# Patient Record
Sex: MALE | Race: WHITE | NOT HISPANIC OR LATINO
[De-identification: names, ages, dates, MRNs, and addresses within clinical notes are randomized per-mention and may not be internally consistent; named-entity substitution may affect disease eponyms.]

---

## 2020-06-29 ENCOUNTER — APPOINTMENT (OUTPATIENT)
Dept: PEDIATRICS | Facility: CLINIC | Age: 20
End: 2020-06-29
Payer: COMMERCIAL

## 2020-06-29 VITALS
BODY MASS INDEX: 28.37 KG/M2 | HEIGHT: 67.75 IN | DIASTOLIC BLOOD PRESSURE: 79 MMHG | HEART RATE: 86 BPM | WEIGHT: 185.06 LBS | TEMPERATURE: 98.7 F | SYSTOLIC BLOOD PRESSURE: 137 MMHG

## 2020-06-29 DIAGNOSIS — Z13.6 ENCOUNTER FOR SCREENING FOR CARDIOVASCULAR DISORDERS: ICD-10-CM

## 2020-06-29 DIAGNOSIS — Z00.00 ENCOUNTER FOR GENERAL ADULT MEDICAL EXAMINATION W/OUT ABNORMAL FINDINGS: ICD-10-CM

## 2020-06-29 LAB
BILIRUB UR QL STRIP: NEGATIVE
GLUCOSE UR-MCNC: NEGATIVE
HCG UR QL: 0.2 EU/DL
HGB UR QL STRIP.AUTO: NORMAL
KETONES UR-MCNC: NEGATIVE
LEUKOCYTE ESTERASE UR QL STRIP: NEGATIVE
NITRITE UR QL STRIP: NEGATIVE
PH UR STRIP: 5.5
PROT UR STRIP-MCNC: NEGATIVE
SP GR UR STRIP: >=1.03

## 2020-06-29 PROCEDURE — 81003 URINALYSIS AUTO W/O SCOPE: CPT | Mod: QW

## 2020-06-29 PROCEDURE — 90651 9VHPV VACCINE 2/3 DOSE IM: CPT

## 2020-06-29 PROCEDURE — 96127 BRIEF EMOTIONAL/BEHAV ASSMT: CPT

## 2020-06-29 PROCEDURE — 99395 PREV VISIT EST AGE 18-39: CPT | Mod: 25

## 2020-06-29 PROCEDURE — 96160 PT-FOCUSED HLTH RISK ASSMT: CPT | Mod: 59

## 2020-06-29 PROCEDURE — 90471 IMMUNIZATION ADMIN: CPT

## 2020-06-29 NOTE — DISCUSSION/SUMMARY
[FreeTextEntry1] : Healthy 18 year old/young adult\par Discussed safety/anticipatory guidance\par Discussed avoiding risk taking behavior\par Discussed healthy lifestyle habits\par Reviewed immunization forecast and discussed need for any vaccines, reviewed side effects and VIS\par Discussed need for routine health maintenance and establishing relationship with adult provider\par Discussed need for routine WILLIAM and gave appropriate handout\par Follow-up: 1 year with adult provider\par \par Discussed and/or provided information on the following:\par PHYSICAL GROWTH AND DEVELOPMENT: Physical and oral health, body image, healthy eating, physical activity\par SOCIAL AND ACADEMIC COMPENTENCE: Connectedness with family, peers, and community; interpersonal relationships; school performance\par EMOTIONAL WELL-BEING: Coping, mood regulation and mental health (depression), sexuality\par RISK REDUCTION: Tobacco, alcohol, or other drugs; pregnancy; STIs, Advice about unprotected sex/birth control\par VIOLENCE AND INJURY PREVENTION: Safety belt and helmet use, driving (graduated license) and substance abuse, guns, interpersonal violence (dating violence), bullying\par PHYSICAL ACTIVITY: Adequate physical activity in organized sports, after-school programs, fun activities; limits on screen time\par COUNSELLED WITH DIET AND EXERCISE\par RTO 2M FOR BP CHECK [] : The components of the vaccine(s) to be administered today are listed in the plan of care. The disease(s) for which the vaccine(s) are intended to prevent and the risks have been discussed with the caretaker.  The risks are also included in the appropriate vaccination information statements which have been provided to the patient's caregiver.  The caregiver has given consent to vaccinate.

## 2020-06-29 NOTE — HISTORY OF PRESENT ILLNESS
[Up to date] : Up to date [Eats meals with family] : eats meals with family [Has family members/adults to turn to for help] : has family members/adults to turn to for help [Is permitted and is able to make independent decisions] : Is permitted and is able to make independent decisions [Grade: ____] : Grade: [unfilled] [Normal Behavior/Attention] : normal behavior/attention [Normal Homework] : normal homework [Normal Performance] : normal performance [Drinks non-sweetened liquids] : drinks non-sweetened liquids  [Eats regular meals including adequate fruits and vegetables] : eats regular meals including adequate fruits and vegetables [Calcium source] : calcium source [Has friends] : has friends [No] : No cigarette smoke exposure [Uses safety belts/safety equipment] : uses safety belts/safety equipment  [Has peer relationships free of violence] : has peer relationships free of violence [Yes] : Patient has had sexual intercourse. [Displays self-confidence] : displays self-confidence [HIV Screening Declined] : HIV Screening Declined [Has ways to cope with stress] : has ways to cope with stress [With Teen] : teen [Sleep Concerns] : no sleep concerns [Screen time (except homework) less than 2 hours a day] : no screen time (except homework) less than 2 hours a day [At least 1 hour of physical activity a day] : does not do at least 1 hour of physical activity a day [Has concerns about body or appearance] : does not have concerns about body or appearance [Has interests/participates in community activities/volunteers] : does not have interests/participates in community activities/volunteers [Uses electronic nicotine delivery system] : does not use electronic nicotine delivery system [Exposure to electronic nicotine delivery system] : no exposure to electronic nicotine delivery system [Uses tobacco] : does not use tobacco [Uses drugs] : does not use drugs  [Exposure to tobacco] : no exposure to tobacco [Exposure to drugs] : no exposure to drugs [Drinks alcohol] : does not drink alcohol [Impaired/distracted driving] : no impaired/distracted driving [Has problems with sleep] : does not have problems with sleep [Exposure to alcohol] : no exposure to alcohol [Gets depressed, anxious, or irritable/has mood swings] : does not get depressed, anxious, or irritable/has mood swings [Has thought about hurting self or considered suicide] : has not thought about hurting self or considered suicide [de-identified] : ALONE [FreeTextEntry1] : 20 YEARS WELL VISIT

## 2020-06-29 NOTE — PHYSICAL EXAM
[Alert] : alert [No Acute Distress] : no acute distress [Normocephalic] : normocephalic [EOMI Bilateral] : EOMI bilateral [Pink Nasal Mucosa] : pink nasal mucosa [Clear tympanic membranes with bony landmarks and light reflex present bilaterally] : clear tympanic membranes with bony landmarks and light reflex present bilaterally  [Supple, full passive range of motion] : supple, full passive range of motion [Nonerythematous Oropharynx] : nonerythematous oropharynx [No Palpable Masses] : no palpable masses [Regular Rate and Rhythm] : regular rate and rhythm [Normal S1, S2 audible] : normal S1, S2 audible [Clear to Auscultation Bilaterally] : clear to auscultation bilaterally [No Murmurs] : no murmurs [+2 Femoral Pulses] : +2 femoral pulses [Soft] : soft [Normoactive Bowel Sounds] : normoactive bowel sounds [NonTender] : non tender [Non Distended] : non distended [No Splenomegaly] : no splenomegaly [No Hepatomegaly] : no hepatomegaly [No Abnormal Lymph Nodes Palpated] : no abnormal lymph nodes palpated [No Gait Asymmetry] : no gait asymmetry [Normal Muscle Tone] : normal muscle tone [Straight] : straight [+2 Patella DTR] : +2 patella DTR [No pain or deformities with palpation of bone, muscles, joints] : no pain or deformities with palpation of bone, muscles, joints [No Rash or Lesions] : no rash or lesions [Cranial Nerves Grossly Intact] : cranial nerves grossly intact

## 2020-08-01 ENCOUNTER — LABORATORY RESULT (OUTPATIENT)
Age: 20
End: 2020-08-01

## 2020-08-05 LAB
ALBUMIN SERPL ELPH-MCNC: 4.8 G/DL
ALP BLD-CCNC: 78 U/L
ALT SERPL-CCNC: 31 U/L
ANION GAP SERPL CALC-SCNC: 13 MMOL/L
APPEARANCE: ABNORMAL
AST SERPL-CCNC: 24 U/L
BASOPHILS # BLD AUTO: 0.02 K/UL
BASOPHILS NFR BLD AUTO: 0.3 %
BILIRUB SERPL-MCNC: 0.3 MG/DL
BILIRUBIN URINE: NEGATIVE
BLOOD URINE: NEGATIVE
BUN SERPL-MCNC: 11 MG/DL
CALCIUM SERPL-MCNC: 9.5 MG/DL
CHLORIDE SERPL-SCNC: 103 MMOL/L
CHOLEST SERPL-MCNC: 199 MG/DL
CHOLEST/HDLC SERPL: 6 RATIO
CO2 SERPL-SCNC: 25 MMOL/L
COLOR: YELLOW
CREAT SERPL-MCNC: 1.02 MG/DL
EOSINOPHIL # BLD AUTO: 0.07 K/UL
EOSINOPHIL NFR BLD AUTO: 1 %
GLUCOSE QUALITATIVE U: NEGATIVE
GLUCOSE SERPL-MCNC: 97 MG/DL
HCT VFR BLD CALC: 51.9 %
HDLC SERPL-MCNC: 33 MG/DL
HGB BLD-MCNC: 16.4 G/DL
IMM GRANULOCYTES NFR BLD AUTO: 0.3 %
KETONES URINE: NEGATIVE
LDLC SERPL CALC-MCNC: 120 MG/DL
LEUKOCYTE ESTERASE URINE: NEGATIVE
LYMPHOCYTES # BLD AUTO: 2.31 K/UL
LYMPHOCYTES NFR BLD AUTO: 33.3 %
MAN DIFF?: NORMAL
MCHC RBC-ENTMCNC: 27.6 PG
MCHC RBC-ENTMCNC: 31.6 GM/DL
MCV RBC AUTO: 87.4 FL
MONOCYTES # BLD AUTO: 0.73 K/UL
MONOCYTES NFR BLD AUTO: 10.5 %
NEUTROPHILS # BLD AUTO: 3.79 K/UL
NEUTROPHILS NFR BLD AUTO: 54.6 %
NITRITE URINE: NEGATIVE
PH URINE: 6
PLATELET # BLD AUTO: 252 K/UL
POTASSIUM SERPL-SCNC: 4.2 MMOL/L
PROT SERPL-MCNC: 7.3 G/DL
PROTEIN URINE: NORMAL
RBC # BLD: 5.94 M/UL
RBC # FLD: 12.7 %
SODIUM SERPL-SCNC: 141 MMOL/L
SPECIFIC GRAVITY URINE: 1.03
TRIGL SERPL-MCNC: 230 MG/DL
TSH SERPL-ACNC: 2.61 UIU/ML
UROBILINOGEN URINE: NORMAL
WBC # FLD AUTO: 6.94 K/UL

## 2020-08-21 ENCOUNTER — APPOINTMENT (OUTPATIENT)
Dept: PEDIATRICS | Facility: CLINIC | Age: 20
End: 2020-08-21
Payer: COMMERCIAL

## 2020-08-21 DIAGNOSIS — Z23 ENCOUNTER FOR IMMUNIZATION: ICD-10-CM

## 2020-08-21 PROCEDURE — 90471 IMMUNIZATION ADMIN: CPT

## 2020-08-21 PROCEDURE — 90651 9VHPV VACCINE 2/3 DOSE IM: CPT
